# Patient Record
(demographics unavailable — no encounter records)

---

## 2024-12-19 NOTE — ASSESSMENT
[FreeTextEntry1] : Currently resolving right submandibular gland sialadenitis. According to him he saw her surgeon in [place] who is recommending a right submandibular gland resection staged by 3 months and then the left submandibular gland resection. At this time I would recommend supportive care on the right gland.  Assuming it gets better I would recommend observation in both settings. If the problem persist or progresses he will have repeat imaging. Furthermore I have asked him to send his imaging from March 2023 so I could see the location of and the size of the stones.  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

## 2024-12-19 NOTE — PHYSICAL EXAM
[TextEntry] : Cranial nerves intact. Floor mouth soft. Indurated right submandibular gland that is not fluctuant but it is tender.

## 2024-12-19 NOTE — HISTORY OF PRESENT ILLNESS
[de-identified] : Initial visit.  Presents today for evaluation of salivary gland pathology. Reports that he was in his otherwise state of fine health when approximately in March 2023 he developed what sounds like and his initial left submandibular gland infection.  Apparently he responded to treatment.  Had a workup and found to have a stone.  The report is available but unfortunately the film is not available for review.  He reports that he was recommended surgery but did not proceed.  He did quite well until October 2024 where he developed another infection.  He then reports that it appears that part of the stone came out from the floor of his mouth.  Felt much better.  Then more recently developed a problem in his right submandibular gland which is initial episode.  He was treated with clindamycin and it is largely improved. It still indurated and an enlarged. Using sialagogues hydration and warm compresses.